# Patient Record
Sex: MALE | Race: WHITE | URBAN - METROPOLITAN AREA
[De-identification: names, ages, dates, MRNs, and addresses within clinical notes are randomized per-mention and may not be internally consistent; named-entity substitution may affect disease eponyms.]

---

## 2017-12-04 ENCOUNTER — APPOINTMENT (RX ONLY)
Dept: URBAN - METROPOLITAN AREA CLINIC 142 | Facility: CLINIC | Age: 82
Setting detail: DERMATOLOGY
End: 2017-12-04

## 2017-12-04 DIAGNOSIS — L57.0 ACTINIC KERATOSIS: ICD-10-CM

## 2017-12-04 DIAGNOSIS — L71.8 OTHER ROSACEA: ICD-10-CM

## 2017-12-04 PROBLEM — E78.5 HYPERLIPIDEMIA, UNSPECIFIED: Status: ACTIVE | Noted: 2017-12-04

## 2017-12-04 PROBLEM — I10 ESSENTIAL (PRIMARY) HYPERTENSION: Status: ACTIVE | Noted: 2017-12-04

## 2017-12-04 PROCEDURE — ? ORDER FOR PHOTODYNAMIC THERAPY

## 2017-12-04 PROCEDURE — 99202 OFFICE O/P NEW SF 15 MIN: CPT

## 2017-12-04 PROCEDURE — ? COUNSELING

## 2017-12-04 PROCEDURE — ? PHOTODYNAMIC THERAPY COUNSELING

## 2017-12-04 ASSESSMENT — LOCATION SIMPLE DESCRIPTION DERM
LOCATION SIMPLE: RIGHT CHEEK
LOCATION SIMPLE: LEFT CHEEK

## 2017-12-04 ASSESSMENT — LOCATION ZONE DERM: LOCATION ZONE: FACE

## 2017-12-04 ASSESSMENT — PAIN INTENSITY VAS: HOW INTENSE IS YOUR PAIN 0 BEING NO PAIN, 10 BEING THE MOST SEVERE PAIN POSSIBLE?: NO PAIN

## 2017-12-04 ASSESSMENT — LOCATION DETAILED DESCRIPTION DERM
LOCATION DETAILED: RIGHT MEDIAL MALAR CHEEK
LOCATION DETAILED: LEFT CENTRAL MALAR CHEEK

## 2017-12-04 ASSESSMENT — SEVERITY ASSESSMENT OVERALL AMONG ALL PATIENTS: IN YOUR EXPERIENCE, AMONG ALL PATIENTS YOU HAVE SEEN WITH THIS CONDITION, HOW SEVERE IS THIS PATIENT'S CONDITION?: MILD

## 2017-12-04 NOTE — PROCEDURE: ORDER FOR PHOTODYNAMIC THERAPY
Detail Level: Zone
Pdt Type: FER-U
Location: Face
Frequency Of Pdt: Single Treatment
Consent: The procedure and risks were reviewed with the patient including but not limited to: burning, pigmentary changes, pain, blistering, scabbing, redness, and the possibility of needing numerous treatments. Strict photoprotection after the procedure was also discussed.
Face Incubation Time: 1 Hour
Scalp Incubation Time: 2 Hours
Face And Scalp Incubation Time: 1 Hour for the face and 2 Hours for the scalp

## 2017-12-08 ENCOUNTER — RX ONLY (OUTPATIENT)
Age: 82
Setting detail: RX ONLY
End: 2017-12-08

## 2017-12-08 RX ORDER — ACYCLOVIR 400 MG/1
TABLET ORAL
Qty: 15 | Refills: 0 | Status: CANCELLED

## 2021-05-20 ENCOUNTER — HOSPITAL ENCOUNTER (OUTPATIENT)
Dept: HOSPITAL 35 - OR | Age: 86
Discharge: HOME | End: 2021-05-20
Attending: OPHTHALMOLOGY
Payer: COMMERCIAL

## 2021-05-20 VITALS — WEIGHT: 180.01 LBS | BODY MASS INDEX: 25.77 KG/M2 | HEIGHT: 70 IN

## 2021-05-20 VITALS — SYSTOLIC BLOOD PRESSURE: 134 MMHG | DIASTOLIC BLOOD PRESSURE: 60 MMHG

## 2021-05-20 DIAGNOSIS — Z88.0: ICD-10-CM

## 2021-05-20 DIAGNOSIS — Z88.8: ICD-10-CM

## 2021-05-20 DIAGNOSIS — Z20.822: ICD-10-CM

## 2021-05-20 DIAGNOSIS — Z79.899: ICD-10-CM

## 2021-05-20 DIAGNOSIS — I10: ICD-10-CM

## 2021-05-20 DIAGNOSIS — Z98.890: ICD-10-CM

## 2021-05-20 DIAGNOSIS — H04.552: Primary | ICD-10-CM

## 2021-05-20 DIAGNOSIS — E78.00: ICD-10-CM

## 2021-05-20 LAB
ANION GAP SERPL CALC-SCNC: 10 MMOL/L (ref 7–16)
BUN SERPL-MCNC: 35 MG/DL (ref 7–18)
CALCIUM SERPL-MCNC: 9.4 MG/DL (ref 8.5–10.1)
CHLORIDE SERPL-SCNC: 107 MMOL/L (ref 98–107)
CO2 SERPL-SCNC: 25 MMOL/L (ref 21–32)
CREAT SERPL-MCNC: 1.7 MG/DL (ref 0.7–1.3)
ERYTHROCYTE [DISTWIDTH] IN BLOOD BY AUTOMATED COUNT: 12.5 % (ref 10.5–14.5)
GLUCOSE SERPL-MCNC: 121 MG/DL (ref 74–106)
HCT VFR BLD CALC: 40.6 % (ref 42–52)
HGB BLD-MCNC: 13.8 GM/DL (ref 14–18)
MCH RBC QN AUTO: 33.4 PG (ref 26–34)
MCHC RBC AUTO-ENTMCNC: 34 G/DL (ref 28–37)
MCV RBC: 98.1 FL (ref 80–100)
PLATELET # BLD: 142 THOU/UL (ref 150–400)
POTASSIUM SERPL-SCNC: 4 MMOL/L (ref 3.5–5.1)
RBC # BLD AUTO: 4.14 MIL/UL (ref 4.5–6)
SODIUM SERPL-SCNC: 142 MMOL/L (ref 136–145)
WBC # BLD AUTO: 6.3 THOU/UL (ref 4–11)

## 2021-05-20 PROCEDURE — 50010 RENAL EXPLORATION: CPT

## 2021-05-20 PROCEDURE — 56528: CPT

## 2021-05-20 PROCEDURE — 62900: CPT

## 2021-05-20 PROCEDURE — 51777: CPT

## 2021-05-20 PROCEDURE — 55343: CPT

## 2021-05-20 PROCEDURE — 50386 REMOVE STENT VIA TRANSURETH: CPT

## 2021-05-20 PROCEDURE — 62110: CPT

## 2021-05-20 PROCEDURE — 50398: CPT

## 2021-05-20 PROCEDURE — 50101: CPT

## 2021-05-20 PROCEDURE — 70005: CPT

## 2021-05-21 NOTE — EKG
[place] [address] [facility]
[place]  [address]
Phone:  [phone]                    ELECTROCARDIOGRAM REPORT      
_______________________________________________________________________________
 
Name:       GAEL GONZALEZ                   Room #:                     REG [facility].#:      [id number]     Account #:      [id number]  
Admission:  21    Attend Phys:    Kaden Mckinney MD  
Discharge:              Date of Birth:  [birth year]  
                                                          Report #: [id number]
   [id number]
_______________________________________________________________________________
                          [facility]
                                       
Test Date:    2021               Test Time:    12:35:30
Pat Name:     GAEL GONZALEZ              Department:   
Patient ID:   [id number]            Room:          
Gender:       M                        Technician:   ARNULFO
:          1933               Requested By: Kaden Mckinney
Order Number: [id number] MD:   Darrel Song
                                 Measurements
Intervals                              Axis          
Rate:         59                       P:            17
KS:           181                      QRS:          -27
QRSD:         111                      T:            150
QT:           443                                    
QTc:          439                                    
                           Interpretive Statements
Sinus rhythm
Abnormal R-wave progression, early transition
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
No previous ECG available for comparison
Electronically Signed On 2021 8:19:39 CDT by Darrel Song
[email or web address]
 
 
 
 
 
 
 
 
 
 
 
 
 
 
 
 
 
 
 
 
 
  <ELECTRONICALLY SIGNED>
   By: Darrel Song MD, [facility]    
  21     0819
D: 21 1235                           _____________________________________
T: 21 1235                           Darrel Song MD, FACC      /EPI

## 2021-05-24 NOTE — O
[facility]
[place]   [address]                     OPERATIVE REPORT              
_______________________________________________________________________________
 
Name:       GAEL GONZALEZ                   Room #:                     [facility] 
[place]..#:      [id number]                       Account #:      [id number]  
Admission:  05/20/21    Attend Phys:    Kaden Mckinney MD  
Discharge:              Date of Birth:  [birth year]  
                                                          Report #: [id number]
                                                                    [id number] 
_______________________________________________________________________________
THIS REPORT FOR:  
 
cc:  Leroy Hopkins MD, Jason C. MD White,Kaden LANGSTON MD                                          ~
 
DOC #: [id number]
 
cc:     Leroy Mckinney MD
DATE OF SERVICE: 05/20/2021
 
PREOPERATIVE DIAGNOSIS:  Unilateral left-sided nasal lacrimal duct obstruction.
 
POSTOPERATIVE DIAGNOSIS:  Unilateral left-sided nasal lacrimal duct obstruction.
 
OPERATION PERFORMED:  Unilateral left-sided endoscopic balloon dacryoplasty with
silicone intubation.
 
ANESTHESIA:  General.
 
COMPLICATIONS:  None.
 
INDICATIONS FOR SURGERY:  This patient has acquired unilateral nasal lacrimal 
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  The current procedures are 
undertaken in order to improve the patient's level of lacrimal outflow and 
visual clarity.
 
Informed consent was obtained to include but not limited to the potential risks 
for damage to the eye, loss of vision, bleeding, infection, failure to improve 
the problem and need for further surgery.
 
DESCRIPTION OF OPERATION:  The patient was taken to the operating room, where 
general anesthesia was administered.  The medial canthus was anesthetized with 
2% Xylocaine with epinephrine mixed with equal parts of 0.75% Marcaine with 
Wydase.  The lateral wall of the nose was then injected with the same anesthetic
mixture.  The nose was packed with Afrin-soaked Cottonoids.  The patient was 
then prepped and draped in the usual sterile fashion.
 
The superior and inferior puncta were then atraumatically dilated with a punctum
dilator.  A size 0 lacrimal probe was then passed through the superior 
canalicular system and through the stenosed nasal lacrimal duct.  The nasal 
packing was removed and the endoscope was brought into the field.  The inferior 
turbinate was gently infractured with a [place] periosteal elevator to allow 
visualization of the inferior meatus in the area of the opening of the valve of 
Hasner in the nose.  The probe was found and confirmed to be in the proper 
 
 
 
[facility]
[address] [place]   [address]                     OPERATIVE REPORT              
_______________________________________________________________________________
 
Name:       LISAGAEL                   Room #:                     [facility] 
M.R.#:      [id number]                       Account #:      [id number]  
Admission:  05/20/21    Attend Phys:    Kaden Mckinney MD  
Discharge:              Date of Birth:  [birth year]  
                                                          Report #: [id number]
                                                                    [id number] 
_______________________________________________________________________________
 
location.  It was removed and subsequently replaced with a size 1 and a size 2 
Fortune probe, which also had their passage confirmed endoscopically to be in the
proper location.
 
A 3 x 15 LacriCatheter was lubricated with a small quantity of ophthalmic 
antibiotic ointment.  The LacriCatheter was then passed through the superior 
canalicular system and the stenosed nasal lacrimal duct.  The LacriCatheter was 
confirmed to be in the proper location endoscopically intranasally in the 
inferior meatus.  The LacriCatheter was inflated to 9 atmospheres for 90 seconds
and deflated.  The catheter was then inflated to 9 atmospheres for 60 seconds.  
The catheter was then withdrawn to the proximal black ring.  It was then 
inflated to 9 atmospheres for 90 seconds.  The balloon was then deflated and 
reinflated to 9 atmospheres for 60 seconds.  The balloon was the aspirated and 
withdrawn to the distal black ring.  It was then inflated to 9 atmospheres for 
90 seconds.  The balloon was deflated and reinflated to 9 atmospheres for 60 
seconds.  The balloon was then deflated and vigorously aspirated as it was 
withdrawn through the superior canalicular system.
 
A Brown tube was then passed through the superior canalicular system and out 
the dilated duct.  The Brown tube was secured under the inferior turbinate in
the inferior meatus with a Brown hook and retrieved endoscopically.  The 
Brown tube was then passed through the inferior canalicular system in a 
similar fashion and was retrieved endoscopically in the nose atraumatically.  
The Brown tube was then secured to itself with 3 square throws and then to 
the lateral wall of the nose with a 5-0 Prolene suture.
 
Antibiotic steroid drops were then placed in the eye.  A small quantity of 
ophthalmic antibiotic ointment was placed on the Brown tube.  The patient was
then transported to the recovery area with no anesthetic or operative 
complications being noted.
 
Kaden Mckinney MD
[facility]
 
D: 05/20/2021 04:00 PM
T: 05/20/2021 04:50 PM
 
 
 
 
 
 
 
  <ELECTRONICALLY SIGNED>
   By: Kaden Mckinney MD          
  05/24/21     0613
D: 05/20/21 1500                           _____________________________________
T: 05/20/21 1550                           Kaden Mckinney MD            /nt